# Patient Record
Sex: MALE
[De-identification: names, ages, dates, MRNs, and addresses within clinical notes are randomized per-mention and may not be internally consistent; named-entity substitution may affect disease eponyms.]

---
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
## 2019-02-11 ENCOUNTER — HOSPITAL ENCOUNTER (EMERGENCY)
Dept: HOSPITAL 25 - UCCORT | Age: 19
Discharge: HOME | End: 2019-02-11
Payer: COMMERCIAL

## 2019-02-11 VITALS — SYSTOLIC BLOOD PRESSURE: 125 MMHG | DIASTOLIC BLOOD PRESSURE: 85 MMHG

## 2019-02-11 DIAGNOSIS — Z88.0: ICD-10-CM

## 2019-02-11 DIAGNOSIS — K12.2: Primary | ICD-10-CM

## 2019-02-11 PROCEDURE — 99212 OFFICE O/P EST SF 10 MIN: CPT

## 2019-02-11 PROCEDURE — G0463 HOSPITAL OUTPT CLINIC VISIT: HCPCS

## 2019-02-11 PROCEDURE — 87651 STREP A DNA AMP PROBE: CPT

## 2019-02-11 PROCEDURE — 87077 CULTURE AEROBIC IDENTIFY: CPT

## 2019-02-11 PROCEDURE — 87070 CULTURE OTHR SPECIMN AEROBIC: CPT

## 2019-02-11 NOTE — UC
General HPI





- HPI Summary


HPI Summary: 





sore throat and "swollen uvula" since yesterday.


my tonsils are always big.


no trouble breathing or with swallowing.





- History of Current Complaint


Chief Complaint: UCGeneralIllness


Stated Complaint: SORE THROAT


Time Seen by Provider: 02/11/19 10:08


Hx Obtained From: Patient


Timing: Constant


Pain Intensity: 2


Associated Signs & Symptoms: Negative: Fever, Headache





- Allergy/Home Medications


Allergies/Adverse Reactions: 


 Allergies











Allergy/AdvReac Type Severity Reaction Status Date / Time


 


amoxicillin Allergy  Rash Verified 02/11/19 10:13














PMH/Surg Hx/FS Hx/Imm Hx


Previously Healthy: Yes





- Surgical History


Surgical History: Yes


Surgery Procedure, Year, and Place: Bone marrow aspiration x 2.  adenoidectomy.

  ear tubes as child





- Family History


Known Family History: Positive: Cardiac Disease - father, Hypertension - mother


   Negative: Diabetes





- Social History


Alcohol Use: Occasionally


Substance Use Type: None


Smoking Status (MU): Never Smoked Tobacco





- Immunization History


Most Recent Influenza Vaccination: not this season


Vaccination Up to Date: Yes





Review of Systems


All Other Systems Reviewed And Are Negative: Yes


Constitutional: Positive: Negative


Skin: Positive: Negative


Eyes: Positive: Negative


ENT: Positive: Sore Throat


Respiratory: Positive: Negative


Cardiovascular: Positive: Negative


Gastrointestinal: Positive: Negative


Genitourinary: Positive: Negative


Motor: Positive: Negative


Neurovascular: Positive: Negative


Musculoskeletal: Positive: Negative


Neurological: Positive: Negative


Psychological: Positive: Negative





Physical Exam


Triage Information Reviewed: Yes


Appearance: Well-Appearing


Vital Signs: 


 Initial Vital Signs











Temp  98.3 F   02/11/19 10:13


 


Pulse  78   02/11/19 10:13


 


Resp  16   02/11/19 10:13


 


BP  125/85   02/11/19 10:13


 


Pulse Ox  99   02/11/19 10:13











Vital Signs Reviewed: Yes


Eyes: Positive: Conjunctiva Clear


ENT: Positive: Pharyngeal erythema, TMs normal, Tonsillar swelling - ? MILD, 

Uvula midline - but modeartely swollen and red..  Negative: Nasal congestion, 

Nasal drainage, Tonsillar exudate, Trismus, Muffled voice, Hoarse voice





Diagnostics





- Laboratory


Diagnostic Studies Completed/Ordered: rapid strep=negative





Course/Dx





- Course


Course Of Treatment: RAPID STREP=NEG, TC=PENDING. GIVEN PE, I AM GOING TO COVER 

PT FOR A PRESUMPTIVE BACTERIAL INFECTION. PT AGREES TO GO TO ER FOR ANY 

WORSENING. NEED FOR CLOSE F/U D/W PT AS WELL.





- Differential Dx - Multi-Symptom


Differential Diagnoses: Other - pharyngitis, uvulitis. no concern for abscess.





- Diagnoses


Provider Diagnosis: 


 Uvulitis








Discharge





- Sign-Out/Discharge


Documenting (check all that apply): Patient Departure


All imaging exams completed and their final reports reviewed: No Studies





- Discharge Plan


Condition: Stable


Disposition: HOME


Prescriptions: 


Clindamycin Cap(NF) [Clindamycin Cap 300 mg Cap(NF)] 300 mg PO TID 10 Days #30 

cap


predniSONE [Prednisone 20 MG TAB] 40 mg PO DAILY 3 Days #6 tablet


Patient Education Materials:  Uvulitis (ED)


Referrals: 


Adam Fung MD [Primary Care Provider] - 3 Days





- Billing Disposition and Condition


Condition: STABLE


Disposition: Home

## 2024-05-02 NOTE — UC
Throat Pain/Nasal David HPI





- HPI Summary


HPI Summary: 





complaint of sore throat started 1 week ago


 tested for strep 3 days ago, treatment for conjunctivitis which has been 

effective


throat pain is still here and chest hurts when he coughs


 productive cough with yellow sputum for 2 days


 has felt chills but no fever


poor appetite 


 taking tylenol and ibuprofen with some relief








- History of Current Complaint


Stated Complaint: CONGESTION,THROAT,FEVER


Time Seen by Provider: 02/06/17 12:38


Hx Obtained From: Patient, Family/Caretaker





- Allergies/Home Medications


Allergies/Adverse Reactions: 


 Allergies











Allergy/AdvReac Type Severity Reaction Status Date / Time


 


Amoxicillin Allergy Intermediate Rash Verified 02/06/17 12:55














PMH/Surg Hx/FS Hx/Imm Hx


Previously Healthy: Yes


Endocrine History Of: 


   Denies: Diabetes, Thyroid Disease


Cardiovascular History Of: 


   Denies: Cardiac Disorders, Hypertension


Respiratory History Of: 


   Denies: COPD, Asthma


GI/ History Of: 


   Denies: Ulcer





- Surgical History


Surgical History: Yes


Surgery Procedure, Year, and Place: Bone marrow aspiration x 2.  adenoidectomy.

  ear tubes as child





- Family History


Known Family History: Positive: Cardiac Disease - father, Hypertension - mother


   Negative: Diabetes





- Social History


Occupation: Student


Lives: With Family


Alcohol Use: Occasionally


Substance Use Type: None


Smoking Status (MU): Never Smoked Tobacco





- Immunization History


Most Recent Influenza Vaccination: not this season


Vaccination Up to Date: Yes





Review of Systems


Constitutional: Chills


Eyes: Negative


ENT: Sore Throat


Respiratory: Cough


Cardiovascular: Negative


Gastrointestinal: Negative


Genitourinary: Negative


Motor: Negative


Neurovascular: Negative


Musculoskeletal: Negative


Neurological: Negative


Psychological: Negative


All Other Systems Reviewed And Are Negative: Yes





Physical Exam


Triage Information Reviewed: Yes


Appearance: No Pain Distress, Well-Nourished, Ill-Appearing


Vital Signs Reviewed: Yes


Eyes: Positive: Conjunctiva Clear


ENT: Positive: Pharyngeal erythema, Nasal congestion, TMs normal, Tonsillar 

swelling, Tonsillar exudate


Dental: Positive: Cervical Lymphadenopathy


Respiratory: Positive: Lungs clear, Normal breath sounds, No respiratory 

distress


Cardiovascular: Positive: RRR, No Murmur, Pulses Normal


Abdomen Description: Positive: Nontender, Soft


Bowel Sounds: Positive: Present


Musculoskeletal: Positive: No Edema


Neurological: Positive: Alert


Psychological Exam: Normal


Skin Exam: Normal





Throat Pain/Nasal Course/Dx





- Differential Dx/Diagnosis


Differential Diagnosis/HQI/PQRI: Tonsillitis


Provider Diagnoses: tonsilitis





Discharge





- Discharge Plan


Condition: Stable


Disposition: HOME


Prescriptions: 


Clarithromycin TAB* [Biaxin TAB*] 500 mg PO BID #20 tab


predniSONE TAB* [Deltasone TAB*] 50 mg PO DAILY #5 tab


Patient Education Materials:  Tonsillitis (ED)


Referrals: 


Adam Smallwood MD [Primary Care Provider] - 


Additional Instructions: 


Start antibiotic and prednisone as directed


Increase fluids and rest


Take acetaminophen or ibuprofen for fever or pain





Please review your discharge instructions. 


 If your symptoms do not improve please call your primary care provider or 

return to urgent care no